# Patient Record
Sex: FEMALE | Race: AMERICAN INDIAN OR ALASKA NATIVE
[De-identification: names, ages, dates, MRNs, and addresses within clinical notes are randomized per-mention and may not be internally consistent; named-entity substitution may affect disease eponyms.]

---

## 2018-03-04 ENCOUNTER — HOSPITAL ENCOUNTER (EMERGENCY)
Dept: HOSPITAL 5 - ED | Age: 48
Discharge: HOME | End: 2018-03-04
Payer: SELF-PAY

## 2018-03-04 VITALS — DIASTOLIC BLOOD PRESSURE: 87 MMHG | SYSTOLIC BLOOD PRESSURE: 128 MMHG

## 2018-03-04 DIAGNOSIS — J06.9: Primary | ICD-10-CM

## 2018-03-04 DIAGNOSIS — I10: ICD-10-CM

## 2018-03-04 PROCEDURE — 71046 X-RAY EXAM CHEST 2 VIEWS: CPT

## 2018-03-04 NOTE — EMERGENCY DEPARTMENT REPORT
- General


Chief Complaint: Upper Respiratory Infection


Stated Complaint: CONGESTED/RUNNY NOSE


Time Seen by Provider: 03/04/18 10:14


Source: patient


Mode of arrival: Ambulatory


Limitations: No Limitations





- History of Present Illness


Initial Comments: 





This is a 47-year-old female nontoxic, well nourished in appearance, no acute 

signs of distress presents to the ED with c/o of productive cough, body aches, 

rhinorrhea, nasal congestion x1 week.  Patient describes productive cough as 

yellow mucus production.  Patient denies any sick contact.  Patient denies any 

recent travels, long car, recent hospital stays.  Patient denies any calf pain 

or calf tenderness.  Patient denies any chest pain, short of breath, fever, 

chills, nausea, vomiting, hemoptysis, numbness, tingling, headache or stiff 

neck.  Patient denies any drug allergies.  Past medical history includes 

diabetes and hypertension.


MD Complaint: cough, rhinorrhea, nasal congestion


-: week(s) (1)


Severity: mild


Severity scale (0 -10): 8


Quality: aching


Consistency: constant


Improves With: nothing


Worsens With: nothing


Associated Symptoms: rhinorrhea, nasal congestion, cough.  denies: fever, chills

, myalgias, diaphoresis, headache, sore throat, stiff neck, chest pain, 

shortness of breath, abdominal pain, nausea, vomiting, diarrhea, dysuria, rash, 

confusion, right sweats, weight loss, epistaxis, hoarseness, ear pain





- Related Data


 Previous Rx's











 Medication  Instructions  Recorded  Last Taken  Type


 


Azithromycin [Zithromax Z-ADAM] 250 mg PO DAILY #6 tablet 03/04/18 Unknown Rx


 


Benzonatate [Tessalon Perle] 100 mg PO Q8H PRN #20 capsule 03/04/18 Unknown Rx


 


Ibuprofen [Motrin] 600 mg PO Q8H PRN #30 tablet 03/04/18 Unknown Rx


 


Prednisone [predniSONE 10 mg 10 mg PO .TAPER #1 tab.ds.pk 03/04/18 Unknown Rx





(6-Day Pack, 21 Tabs)]    











 Allergies











Allergy/AdvReac Type Severity Reaction Status Date / Time


 


Unable to Assess Allergy   Unverified 03/04/18 10:00














ED Review of Systems


ROS: 


Stated complaint: CONGESTED/RUNNY NOSE


Other details as noted in HPI





Constitutional: denies: chills, fever


Eyes: denies: eye pain, eye discharge, vision change


ENT: denies: ear pain, throat pain


Respiratory: cough.  denies: shortness of breath, wheezing


Cardiovascular: denies: chest pain, palpitations


Endocrine: no symptoms reported


Gastrointestinal: denies: abdominal pain, nausea, diarrhea


Genitourinary: denies: urgency, dysuria, discharge


Musculoskeletal: denies: back pain, joint swelling, arthralgia


Skin: denies: rash, lesions


Neurological: denies: headache, weakness, paresthesias


Psychiatric: denies: anxiety, depression


Hematological/Lymphatic: denies: easy bleeding, easy bruising





ED Past Medical Hx





- Past Medical History


Previous Medical History?: Yes


Hx Hypertension: Yes


Hx Diabetes: Yes (borderline)





- Surgical History


Past Surgical History?: Yes


Additional Surgical History: keloid removal from right ear.  tubal ligation





- Social History


Smoking Status: Never Smoker


Substance Use Type: None





- Medications


Home Medications: 


 Home Medications











 Medication  Instructions  Recorded  Confirmed  Last Taken  Type


 


Azithromycin [Zithromax Z-ADAM] 250 mg PO DAILY #6 tablet 03/04/18  Unknown Rx


 


Benzonatate [Tessalon Perle] 100 mg PO Q8H PRN #20 capsule 03/04/18  Unknown Rx


 


Ibuprofen [Motrin] 600 mg PO Q8H PRN #30 tablet 03/04/18  Unknown Rx


 


Prednisone [predniSONE 10 mg 10 mg PO .TAPER #1 tab.ds.pk 03/04/18  Unknown Rx





(6-Day Pack, 21 Tabs)]     














ED Physical Exam





- General


Limitations: No Limitations


General appearance: alert, in no apparent distress





- Head


Head exam: Present: atraumatic, normocephalic





- Eye


Eye exam: Present: normal appearance


Pupils: Present: normal accommodation





- ENT


ENT exam: Present: normal exam, normal orophraynx, mucous membranes moist, TM's 

normal bilaterally, normal external ear exam





- Neck


Neck exam: Present: normal inspection, full ROM.  Absent: tenderness, 

meningismus, lymphadenopathy, thyromegaly





- Respiratory


Respiratory exam: Present: normal lung sounds bilaterally.  Absent: respiratory 

distress, wheezes, rales, rhonchi, stridor, chest wall tenderness, accessory 

muscle use, decreased breath sounds, prolonged expiratory





- Cardiovascular


Cardiovascular Exam: Present: regular rate, normal rhythm, normal heart sounds.

  Absent: bradycardia, tachycardia, irregular rhythm, systolic murmur, 

diastolic murmur, rubs, gallop





- GI/Abdominal


GI/Abdominal exam: Present: soft, normal bowel sounds.  Absent: distended, 

tenderness, guarding, rebound, rigid, diminished bowel sounds





- Rectal


Rectal exam: Present: deferred





- Extremities Exam


Extremities exam: Present: normal inspection, full ROM, normal capillary 

refill.  Absent: tenderness, pedal edema, joint swelling, calf tenderness





- Back Exam


Back exam: Present: normal inspection, full ROM.  Absent: tenderness, CVA 

tenderness (R), CVA tenderness (L), muscle spasm, paraspinal tenderness, 

vertebral tenderness, rash noted





- Neurological Exam


Neurological exam: Present: alert, oriented X3, CN II-XII intact, normal gait, 

reflexes normal





- Psychiatric


Psychiatric exam: Present: normal affect, normal mood





- Skin


Skin exam: Present: warm, dry, intact, normal color.  Absent: rash





ED Course





 Vital Signs











  03/04/18





  10:00


 


Temperature 98.5 F


 


Pulse Rate 72


 


Respiratory 18





Rate 


 


Blood Pressure 128/87


 


O2 Sat by Pulse 99





Oximetry 














- Reevaluation(s)


Reevaluation #1: 





03/04/18 10:29


Patient is speaking in full sentences with no signs of distress noted.





ED Medical Decision Making





- Medical Decision Making





This is a 47-year-old female that presents with upper respiratory infection.  

Patient is stable and was examined by me.  Chest x-ray has been obtained and 

dictated by radiologist with normal exam.  Patient is notified of x-ray results 

with no questions noted. Due to patient having symptoms of upper respiratory 

infection and worsening I will treat patient empirically with zpak and 

prednisone.  Patient is above the >72 hour window for tamiflu if influenza.  

Patient was instructed to increase hydration, rest and take Motrin for fever 

episodes.  Patient received motrin and tesslone perrls in the ED.  Vitals 

stable. Patient is nonfebrile and normal heart rate. Patient was orally 

hydrated and patient tolerated well known nausea or vomiting.  Patient was 

instructed Follow-up with a primary care doctor in 3-5 days or if symptoms 

worsen and continue return to emergency room as soon as possible.  At time time 

of discharge, the patient does not seem toxic or ill in appearance.  No acute 

signs of distress noted.  Patient agrees to discharge treatment plan of care.  

No further questions noted by the patient.


Critical care attestation.: 


If time is entered above; I have spent that time in minutes in the direct care 

of this critically ill patient, excluding procedure time.








ED Disposition


Clinical Impression: 


Upper respiratory infection


Qualifiers:


 URI type: unspecified URI Qualified Code(s): J06.9 - Acute upper respiratory 

infection, unspecified





Disposition: DC-01 TO HOME OR SELFCARE


Is pt being admited?: No


Does the pt Need Aspirin: No


Condition: Stable


Instructions:  Upper Respiratory Infection (ED), Azithromycin (By mouth), 

Prednisone (By mouth)


Additional Instructions: 


Follow-up with a primary care doctor in 3-5 days or if symptoms worsen and 

continue return to emergency room as soon as possible. 


Increased rest, hydration and take Motrin during fever episode.


Prescriptions: 


Azithromycin [Zithromax Z-ADAM] 250 mg PO DAILY #6 tablet


Benzonatate [Tessalon Perle] 100 mg PO Q8H PRN #20 capsule


 PRN Reason: Cough


Ibuprofen [Motrin] 600 mg PO Q8H PRN #30 tablet


 PRN Reason: Fever/Pain


Prednisone [predniSONE 10 mg (6-Day Pack, 21 Tabs)] 10 mg PO .TAPER #1 tab.ds.pk


Referrals: 


PRIMARY CARE,MD [Referring] - 3-5 Days


TROY SWEET MD [Staff Physician] - 3-5 Days


[facility] [Outside] - 3-5 Days


[facility] [Outside] - 3-5 Days


Forms:  Work/School Release Form(ED)